# Patient Record
Sex: FEMALE | Race: WHITE | NOT HISPANIC OR LATINO | Employment: UNEMPLOYED | URBAN - METROPOLITAN AREA
[De-identification: names, ages, dates, MRNs, and addresses within clinical notes are randomized per-mention and may not be internally consistent; named-entity substitution may affect disease eponyms.]

---

## 2021-08-25 ENCOUNTER — HOSPITAL ENCOUNTER (EMERGENCY)
Facility: HOSPITAL | Age: 41
Discharge: HOME | End: 2021-08-25
Attending: EMERGENCY MEDICINE | Admitting: EMERGENCY MEDICINE
Payer: COMMERCIAL

## 2021-08-25 VITALS
OXYGEN SATURATION: 97 % | DIASTOLIC BLOOD PRESSURE: 53 MMHG | TEMPERATURE: 98.9 F | WEIGHT: 270 LBS | HEART RATE: 85 BPM | RESPIRATION RATE: 18 BRPM | SYSTOLIC BLOOD PRESSURE: 112 MMHG

## 2021-08-25 DIAGNOSIS — L50.9 HIVES: Primary | ICD-10-CM

## 2021-08-25 PROCEDURE — 99283 EMERGENCY DEPT VISIT LOW MDM: CPT

## 2021-08-25 PROCEDURE — 96372 THER/PROPH/DIAG INJ SC/IM: CPT

## 2021-08-25 PROCEDURE — 99284 EMERGENCY DEPT VISIT MOD MDM: CPT | Performed by: EMERGENCY MEDICINE

## 2021-08-25 RX ORDER — HYDROXYZINE HYDROCHLORIDE 25 MG/1
25 TABLET, FILM COATED ORAL EVERY 6 HOURS
Qty: 12 TABLET | Refills: 0 | Status: SHIPPED | OUTPATIENT
Start: 2021-08-25

## 2021-08-25 RX ORDER — DIPHENHYDRAMINE HCL 25 MG
25 TABLET ORAL ONCE
Status: COMPLETED | OUTPATIENT
Start: 2021-08-25 | End: 2021-08-25

## 2021-08-25 RX ORDER — EPINEPHRINE 1 MG/ML
0.5 INJECTION, SOLUTION, CONCENTRATE INTRAVENOUS ONCE
Status: COMPLETED | OUTPATIENT
Start: 2021-08-25 | End: 2021-08-25

## 2021-08-25 RX ORDER — FAMOTIDINE 20 MG/1
20 TABLET, FILM COATED ORAL ONCE
Status: COMPLETED | OUTPATIENT
Start: 2021-08-25 | End: 2021-08-25

## 2021-08-25 RX ORDER — EPINEPHRINE 0.3 MG/.3ML
0.3 INJECTION SUBCUTANEOUS ONCE
Qty: 0.6 ML | Refills: 0 | Status: SHIPPED | OUTPATIENT
Start: 2021-08-25 | End: 2021-08-25

## 2021-08-25 RX ADMIN — DIPHENHYDRAMINE HCL 25 MG: 25 TABLET ORAL at 21:53

## 2021-08-25 RX ADMIN — EPINEPHRINE 0.5 MG: 1 INJECTION, SOLUTION, CONCENTRATE INTRAVENOUS at 21:54

## 2021-08-25 RX ADMIN — DEXAMETHASONE SODIUM PHOSPHATE 10 MG: 10 INJECTION, SOLUTION INTRAMUSCULAR; INTRAVENOUS at 21:54

## 2021-08-25 RX ADMIN — FAMOTIDINE 20 MG: 20 TABLET ORAL at 21:54

## 2021-08-25 NOTE — Clinical Note
Junior Suarez was seen and treated in our emergency department on 8/25/2021  Diagnosis:     Charleen Adamson  may return to work on return date  She may return on this date: 08/27/2021    Or sooner if feeling better     If you have any questions or concerns, please don't hesitate to call        Gisselle Stokes MD    ______________________________           _______________          _______________  Hospital Representative                              Date                                Time

## 2021-08-26 NOTE — DISCHARGE INSTRUCTIONS
Patient Instructions: Today you were seen in the emergency department for hives/rash  We examined you and determined that you would be able to be discharged  You should follow up with a primary care doctor  Please return to the emergency department if your symptoms get worse, you develop a fever, or you have any other symptoms we discussed today prior to discharge  Nice to meet you! Best of luck with everything!

## 2021-08-26 NOTE — ED ATTENDING ATTESTATION
8/25/2021  I, Sharee Ponce MD, saw and evaluated the patient  I have discussed the patient with the resident/non-physician practitioner and agree with the resident's/non-physician practitioner's findings, Plan of Care, and MDM as documented in the resident's/non-physician practitioner's note, except where noted  All available labs and Radiology studies were reviewed  I was present for key portions of any procedure(s) performed by the resident/non-physician practitioner and I was immediately available to provide assistance  At this point I agree with the current assessment done in the Emergency Department    I have conducted an independent evaluation of this patient a history and physical is as follows:  Patient presents for allergic reaction hives itching  No shortness of breath  no abdominal symptoms  Exam patient has hives throat clear  Lungs clear abdomen soft nontender  Impression acute allergic reaction  Epinephrine benadryl     ED Course         Critical Care Time  Procedures

## 2021-08-26 NOTE — ED PROVIDER NOTES
Final Diagnosis:  1  Hives         Chief Complaint   Patient presents with    Allergic Reaction     Pt reports gen red/itchy/warm hives all over her body  Pt states she took  off brand excederine and then experienced the hives       ASSESSMENT + PLAN:   - Nursing note reviewed  1  Hives  -will give Epi for symptomatic relief, will give antihistamines  -no airway involvement, no known allergies, and not true anaphylaxis, appears to be more like hives/rash      Final Dispo   Patient was reassessed  Vital signs stable  Patient and/or family given discharge instructions and return precautions  Patient and/or family was reassured  The patient and/or family vocalizes understanding  Answered all of the patient's and/or family's questions  Will follow up with PCP  Patient and/or family are agreeable to the plan  Medications   EPINEPHrine PF (ADRENALIN) 1 mg/mL injection 0 5 mg (0 5 mg Intramuscular Given 21)   diphenhydrAMINE (BENADRYL) tablet 25 mg (25 mg Oral Given 21)   famotidine (PEPCID) tablet 20 mg (20 mg Oral Given 21)   dexamethasone oral liquid 10 mg 1 mL (10 mg Oral Given 21)     Time reflects when diagnosis was documented in both MDM as applicable and the Disposition within this note     Time User Action Codes Description Comment    2021 10:57 PM Ted Boo 62       ED Disposition     ED Disposition Condition Date/Time Comment    Discharge Stable Wed Aug 25, 2021 10:57 PM Magdaleno Villalobos discharge to home/self care              Follow-up Information     Follow up With Specialties Details Why Contact Info Additional [facility] Medicine Call   [address], [address] [facility] [address]  [address], [address], [address] [place], [place], [address]        Discharge Medication List as of 2021 10:59 PM      START taking these medications    Details   EPINEPHrine (EPIPEN) 0 3 mg/0 3 mL SOAJ Inject 0 3 mL (0 3 mg total) into a muscle once for 1 dose, Starting Wed 2021, Print           No discharge procedures on file  None       History of Present Illness: This is a 37 y o  female presenting today for evaluation of rash over patient's bilateral arms and legs  Patient says that she took an Excedrin that was   She said that shortly after that, she developed a rash on her arms no legs  She feels like it is also on her chest and her back  She says it is pruritic, but not painful  No associated airway involvement  No wheezing  Patient has no known allergies  No other new exposures  No recent travel  No recent changes in medications  No fever, chills, chest pain, shortness of breath, headache, abdominal pain,  nausea, vomiting, diarrhea, rash or any other complaints  Patient notes that they have been eating/drinking normally  Otherwise in her normal state of health  - No language barrier    - History obtained from patient and chart   - There are no limitations to the history obtained  - Previous charting was reviewed  Some data reviewed included below for ease of access whether or not it is relevant to this patient encounter  Past Medical, Past Surgical History:    has no past medical history on file  has no past surgical history on file  Allergies:   No Known Allergies    Social and Family History:     Social History     Substance and Sexual Activity   Alcohol Use Not Currently     Social History     Tobacco Use   Smoking Status Current Every Day Smoker    Packs/day: 1 50   Smokeless Tobacco Never Used     Social History     Substance and Sexual Activity   Drug Use Yes    Types: Marijuana       Review of Systems:   Review of Systems   Constitutional: Negative for chills, diaphoresis and fever  HENT: Negative  Eyes: Negative    Negative for visual disturbance  Respiratory: Negative  Negative for shortness of breath  Cardiovascular: Negative  Negative for chest pain  Gastrointestinal: Negative  Negative for abdominal pain, nausea and vomiting  Endocrine: Negative  Genitourinary: Negative  Musculoskeletal: Negative  Negative for myalgias  Skin: Positive for rash  Allergic/Immunologic: Negative  Neurological: Negative  Negative for weakness, light-headedness, numbness and headaches  Hematological: Negative  Psychiatric/Behavioral: Negative  All other systems reviewed and are negative  Physical Examination     Vitals:    08/25/21 1956 08/25/21 2000 08/25/21 2219   BP: 130/89  112/53   BP Location: Left arm  Right arm   Pulse: 100  85   Resp: 20  18   Temp:  98 9 °F (37 2 °C)    TempSrc:  Oral    SpO2: 98%  97%   Weight: 122 kg (270 lb)       Vitals reviewed by me  Physical Exam  Vitals and nursing note reviewed  Constitutional:       General: She is not in acute distress  Appearance: She is not ill-appearing or toxic-appearing  HENT:      Head: Atraumatic  Right Ear: External ear normal       Left Ear: External ear normal       Nose: Nose normal       Mouth/Throat:      Pharynx: Oropharynx is clear  Eyes:      General: No scleral icterus  Extraocular Movements: Extraocular movements intact  Pupils: Pupils are equal, round, and reactive to light  Cardiovascular:      Rate and Rhythm: Normal rate  Pulses: Normal pulses  Heart sounds: Normal heart sounds  Pulmonary:      Effort: Pulmonary effort is normal  No respiratory distress  Breath sounds: Normal breath sounds  Abdominal:      General: There is no distension  Tenderness: There is no abdominal tenderness  Musculoskeletal:         General: No deformity  Normal range of motion  Skin:     Findings: Rash present        Comments: Hives covering patient's arms and legs, chest/back, not on palms or soles, no mucous membrane involvement, slightly raised, blanchable   Neurological:      General: No focal deficit present  Mental Status: She is oriented to person, place, and time  Mental status is at baseline  Cranial Nerves: No cranial nerve deficit  Motor: No weakness  Gait: Gait normal    Psychiatric:         Mood and Affect: Mood normal                               No orders to display     No orders of the defined types were placed in this encounter  Labs:   Labs Reviewed - No data to display    Imaging:   No results found  Final Diagnosis:  1  Hives        Code Status: No Order    Portions of the record may have been created with voice recognition software  Occasional wrong word or "sound a like" substitutions may have occurred due to the inherent limitations of voice recognition software  Read the chart carefully and recognize, using context, where substitutions have occurred      Electronically signed by:  Cyrilla Schilder, PGY 3, MD Madelaine Esposito MD  08/26/21 8121

## 2022-06-25 ENCOUNTER — OFFICE VISIT (OUTPATIENT)
Dept: URGENT CARE | Facility: CLINIC | Age: 42
End: 2022-06-25
Payer: MEDICARE

## 2022-06-25 VITALS
SYSTOLIC BLOOD PRESSURE: 123 MMHG | WEIGHT: 270 LBS | OXYGEN SATURATION: 99 % | HEART RATE: 78 BPM | RESPIRATION RATE: 18 BRPM | TEMPERATURE: 98 F | HEIGHT: 72 IN | DIASTOLIC BLOOD PRESSURE: 80 MMHG | BODY MASS INDEX: 36.57 KG/M2

## 2022-06-25 DIAGNOSIS — L03.317 CELLULITIS OF BUTTOCK: Primary | ICD-10-CM

## 2022-06-25 PROCEDURE — 99213 OFFICE O/P EST LOW 20 MIN: CPT | Performed by: PHYSICIAN ASSISTANT

## 2022-06-25 RX ORDER — SULFAMETHOXAZOLE AND TRIMETHOPRIM 800; 160 MG/1; MG/1
1 TABLET ORAL EVERY 12 HOURS SCHEDULED
Qty: 14 TABLET | Refills: 0 | Status: SHIPPED | OUTPATIENT
Start: 2022-06-25 | End: 2022-07-02

## 2022-06-25 RX ORDER — IBUPROFEN 200 MG
TABLET ORAL EVERY 6 HOURS PRN
COMMUNITY

## 2022-06-25 NOTE — PROGRESS NOTES
[facility]        NAME: Dominique Lo is a 40 y o  female  : 1980    MRN: [id number]  DATE: 2022  TIME: 11:52 AM    Assessment and Plan   Cellulitis of buttock [U57 601]  1  Cellulitis of buttock  sulfamethoxazole-trimethoprim (BACTRIM DS) 800-160 mg per tablet         Patient Instructions   Take antibiotic as prescribed  Complete full dose of antibiotics even if symptoms begin to improve or resolve  Sitz baths  Observe for signs of worsening infection including increased swelling, redness, pain, discharge, fever or chills, or persistent symptoms  Your symptoms should begin to improve over the next couple days  Follow up with PCP in 3-5 days  Proceed to  ER if symptoms worsen  Chief Complaint     Chief Complaint   Patient presents with    abcess     Has an abcess on left inner buttock that is painful         History of Present Illness       Patient is a 71-year-old female with no significant past medical history presents the office complaining of abscess to left buttocks for 4 days  Pain is rated 9/10  History of pilonidal cyst childhood  Denies fevers or chills  Review of Systems   Review of Systems   Constitutional: Negative for chills and fever  Skin: Positive for color change           Current Medications       Current Outpatient Medications:     ibuprofen (MOTRIN) 200 mg tablet, Take by mouth every 6 (six) hours as needed for mild pain, Disp: , Rfl:     sulfamethoxazole-trimethoprim (BACTRIM DS) 800-160 mg per tablet, Take 1 tablet by mouth every 12 (twelve) hours for 7 days, Disp: 14 tablet, Rfl: 0    EPINEPHrine (EPIPEN) 0 3 mg/0 3 mL SOAJ, Inject 0 3 mL (0 3 mg total) into a muscle once for 1 dose, Disp: 0 6 mL, Rfl: 0    hydrOXYzine HCL (ATARAX) 25 mg tablet, Take 1 tablet (25 mg total) by mouth every 6 (six) hours, Disp: 12 tablet, Rfl: 0    Current Allergies     Allergies as of 2022    (No Known Allergies)            The following portions of the patient's history were reviewed and updated as appropriate: allergies, current medications, past family history, past medical history, past social history, past surgical history and problem list      Past Medical History:   Diagnosis Date    Anxiety     OCD (obsessive compulsive disorder)        Past Surgical History:   Procedure Laterality Date    CHOLECYSTECTOMY      DILATION AND CURETTAGE OF UTERUS      MOUTH SURGERY         Family History   Problem Relation Age of Onset    No Known Problems Mother     No Known Problems Father          Medications have been verified  Objective   /80   Pulse 78   Temp 98 °F (36 7 °C)   Resp 18   Ht 6' 2" (1 88 m)   Wt 122 kg (270 lb)   LMP 06/24/2022   SpO2 99%   BMI 34 67 kg/m²   Patient's last menstrual period was 06/24/2022  Physical Exam     Physical Exam  Vitals and nursing note reviewed  Constitutional:       Appearance: She is well-developed  HENT:      Head: Normocephalic and atraumatic  Right Ear: External ear normal       Left Ear: External ear normal       Nose: Nose normal    Eyes:      General: Lids are normal       Conjunctiva/sclera: Conjunctivae normal    Cardiovascular:      Rate and Rhythm: Normal rate and regular rhythm  Pulmonary:      Effort: Pulmonary effort is normal       Breath sounds: Normal breath sounds  Genitourinary:     Comments: 2 cm area of erythema, warmth, and hard induration to left gluteal cleft  TTP without drainable abscess  Skin:     General: Skin is warm and dry  Capillary Refill: Capillary refill takes less than 2 seconds  Neurological:      Mental Status: She is alert

## 2022-06-25 NOTE — PATIENT INSTRUCTIONS
Take antibiotic as prescribed  Complete full dose of antibiotics even if symptoms begin to improve or resolve  Sitz baths  Observe for signs of worsening infection including increased swelling, redness, pain, discharge, fever or chills, or persistent symptoms  Your symptoms should begin to improve over the next couple days  Follow-up with your PCP in 3-5 days if symptoms worsen or do not improve  Go to ER if symptoms become severe

## 2022-06-29 ENCOUNTER — TELEPHONE (OUTPATIENT)
Dept: URGENT CARE | Facility: CLINIC | Age: 42
End: 2022-06-29

## 2022-06-29 ENCOUNTER — HOSPITAL ENCOUNTER (EMERGENCY)
Facility: HOSPITAL | Age: 42
Discharge: HOME | End: 2022-06-29
Attending: STUDENT IN AN ORGANIZED HEALTH CARE EDUCATION/TRAINING PROGRAM | Admitting: STUDENT IN AN ORGANIZED HEALTH CARE EDUCATION/TRAINING PROGRAM

## 2022-06-29 VITALS
TEMPERATURE: 98.3 F | HEIGHT: 72 IN | BODY MASS INDEX: 36.57 KG/M2 | HEART RATE: 78 BPM | WEIGHT: 270 LBS | RESPIRATION RATE: 20 BRPM

## 2022-06-29 DIAGNOSIS — L03.317 ABSCESS AND CELLULITIS OF GLUTEAL REGION: Primary | ICD-10-CM

## 2022-06-29 DIAGNOSIS — L02.31 ABSCESS AND CELLULITIS OF GLUTEAL REGION: Primary | ICD-10-CM

## 2022-06-29 PROCEDURE — 99283 EMERGENCY DEPT VISIT LOW MDM: CPT

## 2022-06-29 PROCEDURE — 87205 SMEAR GRAM STAIN: CPT | Performed by: STUDENT IN AN ORGANIZED HEALTH CARE EDUCATION/TRAINING PROGRAM

## 2022-06-29 PROCEDURE — 87070 CULTURE OTHR SPECIMN AEROBIC: CPT | Performed by: STUDENT IN AN ORGANIZED HEALTH CARE EDUCATION/TRAINING PROGRAM

## 2022-06-29 PROCEDURE — 46050 I&D PERIANAL ABSCESS SUPFC: CPT | Performed by: STUDENT IN AN ORGANIZED HEALTH CARE EDUCATION/TRAINING PROGRAM

## 2022-06-29 PROCEDURE — 99284 EMERGENCY DEPT VISIT MOD MDM: CPT | Performed by: STUDENT IN AN ORGANIZED HEALTH CARE EDUCATION/TRAINING PROGRAM

## 2022-06-29 RX ORDER — LIDOCAINE HYDROCHLORIDE AND EPINEPHRINE 10; 10 MG/ML; UG/ML
1 INJECTION, SOLUTION INFILTRATION; PERINEURAL ONCE
Status: COMPLETED | OUTPATIENT
Start: 2022-06-29 | End: 2022-06-29

## 2022-06-29 RX ORDER — CLINDAMYCIN HYDROCHLORIDE 150 MG/1
450 CAPSULE ORAL EVERY 8 HOURS SCHEDULED
Qty: 60 CAPSULE | Refills: 0 | Status: SHIPPED | OUTPATIENT
Start: 2022-06-29 | End: 2022-07-06

## 2022-06-29 RX ORDER — CLINDAMYCIN HYDROCHLORIDE 150 MG/1
450 CAPSULE ORAL ONCE
Status: COMPLETED | OUTPATIENT
Start: 2022-06-29 | End: 2022-06-29

## 2022-06-29 RX ADMIN — LIDOCAINE HYDROCHLORIDE,EPINEPHRINE BITARTRATE 1 ML: 10; .01 INJECTION, SOLUTION INFILTRATION; PERINEURAL at 20:58

## 2022-06-29 RX ADMIN — CLINDAMYCIN HYDROCHLORIDE 450 MG: 150 CAPSULE ORAL at 21:21

## 2022-06-29 NOTE — TELEPHONE ENCOUNTER
Patient called reports is much worse  Reports is taking antibiotic as instructed and called her family doctor who would see her but instructed her that would probably be sending her to the ER  Patient reported difficulty in getting to ER  Discussed, what patient said, with Dr Cas Martin who saw patient here 5/25/22  Dr Cas Martin agrees with PCP and advises her to go to ER for further treatment  Patient informed of same

## 2022-06-30 NOTE — ED PROVIDER NOTES
History  Chief Complaint   Patient presents with    Abscess     Abscess on tailbone, states she went to urgent care and was given bactrim  Abscess now larger and more painful, told to come to ED for drainage  52-year-old female  Presents to the emergency department with increased redness/induration/pain/warmth along the left gluteal cleft  She states that she has been having a developing abscess for the past 2 weeks  Has been taking course of Bactrim for the past 4 days  Pain has been worsening  Denies fever/chills  History provided by:  Patient  Abscess  Location:  Pelvis  Pelvic abscess location:  Gluteal cleft  Size:  5 x 3 cm  Abscess quality: fluctuance, induration, painful, redness and warmth    Red streaking: yes    Duration:  2 weeks  Progression:  Worsening  Pain details:     Quality:  Throbbing    Severity:  Moderate    Duration:  4 days    Timing:  Constant    Progression:  Worsening  Chronicity:  New  Relieved by:  Nothing  Exacerbated by: Sitting  Ineffective treatments: Bactrim  Associated symptoms: no fatigue, no fever, no headaches, no nausea and no vomiting        Prior to Admission Medications   Prescriptions Last Dose Informant Patient Reported? Taking?    EPINEPHrine (EPIPEN) 0 3 mg/0 3 mL SOAJ   No No   Sig: Inject 0 3 mL (0 3 mg total) into a muscle once for 1 dose   hydrOXYzine HCL (ATARAX) 25 mg tablet   No No   Sig: Take 1 tablet (25 mg total) by mouth every 6 (six) hours   ibuprofen (MOTRIN) 200 mg tablet   Yes No   Sig: Take by mouth every 6 (six) hours as needed for mild pain   sulfamethoxazole-trimethoprim (BACTRIM DS) 800-160 mg per tablet   No No   Sig: Take 1 tablet by mouth every 12 (twelve) hours for 7 days      Facility-Administered Medications: None       Past Medical History:   Diagnosis Date    Anxiety     OCD (obsessive compulsive disorder)        Past Surgical History:   Procedure Laterality Date    CHOLECYSTECTOMY      DILATION AND CURETTAGE OF UTERUS  MOUTH SURGERY         Family History   Problem Relation Age of Onset    No Known Problems Mother     No Known Problems Father      I have reviewed and agree with the history as documented  E-Cigarette/Vaping     E-Cigarette/Vaping Substances     Social History     Tobacco Use    Smoking status: Current Every Day Smoker     Packs/day: 1 50    Smokeless tobacco: Never Used   Substance Use Topics    Alcohol use: Not Currently    Drug use: Yes     Types: Marijuana     Review of Systems   Constitutional: Negative for chills, fatigue and fever  HENT: Negative for congestion, rhinorrhea, sinus pressure and sinus pain  Respiratory: Negative for chest tightness and shortness of breath  Cardiovascular: Negative for chest pain and palpitations  Gastrointestinal: Negative for abdominal pain, diarrhea, nausea and vomiting  Genitourinary: Positive for pelvic pain  Negative for decreased urine volume, difficulty urinating, dysuria, flank pain, frequency and urgency  Musculoskeletal: Positive for back pain  Negative for arthralgias, myalgias, neck pain and neck stiffness  Skin: Positive for color change and wound  Negative for pallor and rash  Neurological: Negative for dizziness, syncope, weakness, light-headedness, numbness and headaches  Hematological: Does not bruise/bleed easily  Psychiatric/Behavioral: Positive for sleep disturbance  Negative for confusion  All other systems reviewed and are negative  Physical Exam  Physical Exam  Vitals and nursing note reviewed  Constitutional:       General: She is in acute distress  Appearance: She is ill-appearing  She is not toxic-appearing  HENT:      Head: Normocephalic and atraumatic  Right Ear: External ear normal       Left Ear: External ear normal       Nose: No congestion or rhinorrhea  Eyes:      General:         Right eye: No discharge  Left eye: No discharge        Extraocular Movements: Extraocular movements intact  Conjunctiva/sclera: Conjunctivae normal    Cardiovascular:      Rate and Rhythm: Normal rate and regular rhythm  Pulses: Normal pulses  Heart sounds: Normal heart sounds  No murmur heard  Pulmonary:      Effort: Pulmonary effort is normal  No respiratory distress  Breath sounds: Normal breath sounds  No stridor  No wheezing, rhonchi or rales  Chest:      Chest wall: No tenderness  Abdominal:      General: Bowel sounds are normal       Palpations: Abdomen is soft  Tenderness: There is no abdominal tenderness  There is no right CVA tenderness, left CVA tenderness or guarding  Musculoskeletal:         General: Tenderness present  No swelling or signs of injury  Legs:       Comments: 5 x 3 cm area of fluctuance along the left gluteal cleft with associated pain/redness/warmth  Skin:     General: Skin is warm and dry  Capillary Refill: Capillary refill takes less than 2 seconds  Coloration: Skin is not jaundiced or pale  Findings: Erythema and rash present  No bruising or lesion  Neurological:      General: No focal deficit present  Mental Status: She is alert and oriented to person, place, and time  Mental status is at baseline  Cranial Nerves: No cranial nerve deficit  Sensory: No sensory deficit  Motor: No weakness  Psychiatric:         Mood and Affect: Mood normal          Behavior: Behavior normal          Thought Content:  Thought content normal          Judgment: Judgment normal        Vital Signs  ED Triage Vitals [06/29/22 2029]   Temperature Pulse Respirations BP SpO2   98 3 °F (36 8 °C) 78 20 -- --      Temp Source Heart Rate Source Patient Position - Orthostatic VS BP Location FiO2 (%)   Temporal Monitor Sitting Right arm --      Pain Score       9           Vitals:    06/29/22 2029   Pulse: 78   Patient Position - Orthostatic VS: Sitting     ED Medications  Medications   lidocaine-epinephrine (XYLOCAINE/EPINEPHRINE) 1 %-1:100,000 injection 1 mL (1 mL Infiltration Given 6/29/22 2058)   clindamycin (CLEOCIN) capsule 450 mg (450 mg Oral Given 6/29/22 2121)     Diagnostic Studies  Results Reviewed     Procedure Component Value Units Date/Time    Wound culture and Gram stain [[id number]] Collected: 06/29/22 2121    Lab Status: In process Specimen: Wound from Sacrum Updated: 06/29/22 2121             No orders to display          Procedures  Incision and drain    Date/Time: 6/29/2022 9:01 PM  Performed by: Agueda Estrada DO  Authorized by: Agueda Estrada DO   Universal Protocol:  Consent: Verbal consent obtained  Consent given by: patient  Patient understanding: patient states understanding of the procedure being performed  Site marked: the operative site was marked  Patient identity confirmed: verbally with patient      Patient location:  ED  Location:     Type:  Abscess    Size:  5 x 3 cm     Location:  Anogenital    Anogenital location:  Gluteal cleft  Pre-procedure details:     Skin preparation:  Betadine  Anesthesia (see MAR for exact dosages): Anesthesia method:  Local infiltration    Local anesthetic:  Lidocaine 1% WITH epi  Procedure details:     Complexity:  Simple    Needle aspiration: no      Incision types:  Single straight    Aspiration type: puncture aspiration      Scalpel blade:  11    Approach:  Open    Incision depth:  Subcutaneous    Wound management:  Probed and deloculated and extensive cleaning    Drainage:  Purulent    Drainage amount:  Copious    Wound treatment:  Drain placed    Packing materials:  1/2 in iodoform gauze  Post-procedure details:     Patient tolerance of procedure: Tolerated well, no immediate complications  POC MSK/Soft Tissue US    Date/Time: 6/29/2022 8:55 PM  Performed by: Agueda Estrada DO  Authorized by: Agueda Estrada DO     Patient location:  ED  Performed by:   Attending  Procedure:     Performed: soft tissue ultrasound    Procedure details:     Exam Type:  Diagnostic Longitudinal view:  Obtained    Transverse view:  Obtained    Image quality: diagnostic      Image availability:  Not saved  Soft tissue ultrasound:     Soft tissue indications: suspected abscess      Anatomic location:  Buttock    Soft tissue findings: subcutaneous collection (comment)    Interpretation:     Soft tissue impressions: consistent with abscess        ED Course       MDM     73-year-old female  Presents to the emergency department with signs and symptoms of a left gluteal cleft abscess with cellulitis  The patient denies fever/chills  Has been taking Bactrim BID For the past 4 days  Bedside ultrasound showing a large subcutaneous fluid collection  Incision and drainage was performed at the bedside  A copious amount of purulent, malodorous discharge was expressed  Wound culture is pending  See procedural note above for details  The patient was prescribed a course of Clindamycin  Instructed to d/c Bactrim  She was also urged to follow up with her PCP in 48 hours for a wound check  OP General Surgery referral was provided  Return precautions were discussed with the patient  All questions were addressed  The patient was stable for discharge  Disposition  Final diagnoses:   Abscess and cellulitis of gluteal region     Time reflects when diagnosis was documented in both MDM as applicable and the Disposition within this note     Time User Action Codes Description Comment    6/29/2022  9:16 PM Star Winslow Add [L02 31,  I80 366] Abscess and cellulitis of gluteal region       ED Disposition     ED Disposition   Discharge    Condition   Stable    Date/Time   Wed Jun 29, 2022  9:16 PM    Comment   Jean Marie Valencia discharge to home/self care                 Follow-up Information     Follow up With Specialties Details Why Contact Info    MEDARDO Joyner Nurse Practitioner In 2 days For wound re-check [address] [place] [id number] [phone]            Discharge Medication List as of 6/29/2022  9:19 PM      START taking these medications    Details   clindamycin (CLEOCIN) 150 mg capsule Take 3 capsules (450 mg total) by mouth every 8 (eight) hours for 7 days, Starting Wed 6/29/2022, Until Wed 7/6/2022, Normal         CONTINUE these medications which have NOT CHANGED    Details   EPINEPHrine (EPIPEN) 0 3 mg/0 3 mL SOAJ Inject 0 3 mL (0 3 mg total) into a muscle once for 1 dose, Starting Wed 8/25/2021, Print      hydrOXYzine HCL (ATARAX) 25 mg tablet Take 1 tablet (25 mg total) by mouth every 6 (six) hours, Starting Wed 8/25/2021, Print      ibuprofen (MOTRIN) 200 mg tablet Take by mouth every 6 (six) hours as needed for mild pain, Historical Med      sulfamethoxazole-trimethoprim (BACTRIM DS) 800-160 mg per tablet Take 1 tablet by mouth every 12 (twelve) hours for 7 days, Starting Sat 6/25/2022, Until Sat 7/2/2022, Normal                 PDMP Review     None          ED Provider  Electronically Signed by           Juvencio Garcia DO  06/29/22 9157

## 2022-06-30 NOTE — DISCHARGE INSTRUCTIONS
You were evaluated in the emergency department for increased pain, abscess along your gluteal region  The abscess was incised and drained at the bedside  Follow-up with your primary care physician in 2 days for a wound check  Keep the drain inserted until follow-up with your PCP  If the drain falls out, do not attempt to reinsert it  You are being prescribed a course of clindamycin  Please take as directed  A referral to General surgery was provided  Expect a phone call within the next few days to set up that appointment  Do not hesitate to be re-evaluated in the ED for any concerning signs or symptoms

## 2022-07-02 LAB
BACTERIA WND AEROBE CULT: ABNORMAL
GRAM STN SPEC: ABNORMAL